# Patient Record
(demographics unavailable — no encounter records)

---

## 2024-10-29 NOTE — HISTORY OF PRESENT ILLNESS
[FreeTextEntry6] :  Seventeen year old male brought to the office because of cough since Sunday with fever since yesterday. Very congested / stuffy and also is achy. He had complained of sore throat on Sunday and has no appetite.

## 2024-10-29 NOTE — DISCUSSION/SUMMARY
[FreeTextEntry1] :  17 year male with acute non streptococcal pharyngitis/viral syndrome. Strep and influenza tests were negative.  Will get RVP to R/O Mycoplasma Continue symptomatic relief,with  fever reducers,lots of  fluids and rest. Will prescribe Z pack and wait for results of RVP.

## 2024-10-29 NOTE — PHYSICAL EXAM
[Mucoid Discharge] : mucoid discharge [Inflamed Nasal Mucosa] : inflamed nasal mucosa [Erythematous Oropharynx] : erythematous oropharynx [Transmitted Upper Airway Sounds] : transmitted upper airway sounds [NL] : warm, clear [FreeTextEntry7] : lots of transmitted sounds and scattered ronchi